# Patient Record
Sex: FEMALE | Race: BLACK OR AFRICAN AMERICAN | NOT HISPANIC OR LATINO
[De-identification: names, ages, dates, MRNs, and addresses within clinical notes are randomized per-mention and may not be internally consistent; named-entity substitution may affect disease eponyms.]

---

## 2019-06-24 ENCOUNTER — RESULT REVIEW (OUTPATIENT)
Age: 74
End: 2019-06-24

## 2019-06-24 ENCOUNTER — APPOINTMENT (OUTPATIENT)
Dept: GERIATRICS | Facility: CLINIC | Age: 74
End: 2019-06-24
Payer: MEDICARE

## 2019-06-24 VITALS
TEMPERATURE: 98.1 F | DIASTOLIC BLOOD PRESSURE: 62 MMHG | SYSTOLIC BLOOD PRESSURE: 132 MMHG | WEIGHT: 187 LBS | HEART RATE: 62 BPM | OXYGEN SATURATION: 98 % | HEIGHT: 61 IN | BODY MASS INDEX: 35.3 KG/M2

## 2019-06-24 DIAGNOSIS — Z82.5 FAMILY HISTORY OF ASTHMA AND OTHER CHRONIC LOWER RESPIRATORY DISEASES: ICD-10-CM

## 2019-06-24 DIAGNOSIS — Z00.00 ENCOUNTER FOR GENERAL ADULT MEDICAL EXAMINATION W/OUT ABNORMAL FINDINGS: ICD-10-CM

## 2019-06-24 DIAGNOSIS — Z60.2 PROBLEMS RELATED TO LIVING ALONE: ICD-10-CM

## 2019-06-24 DIAGNOSIS — Z81.8 FAMILY HISTORY OF OTHER MENTAL AND BEHAVIORAL DISORDERS: ICD-10-CM

## 2019-06-24 DIAGNOSIS — Z84.2 FAMILY HISTORY OF OTHER DISEASES OF THE GENITOURINARY SYSTEM: ICD-10-CM

## 2019-06-24 DIAGNOSIS — Z78.9 OTHER SPECIFIED HEALTH STATUS: ICD-10-CM

## 2019-06-24 PROCEDURE — 99205 OFFICE O/P NEW HI 60 MIN: CPT

## 2019-06-24 SDOH — SOCIAL STABILITY - SOCIAL INSECURITY: PROBLEMS RELATED TO LIVING ALONE: Z60.2

## 2019-06-25 PROBLEM — Z82.5 FAMILY HISTORY OF ASTHMA: Status: ACTIVE | Noted: 2019-06-25

## 2019-06-25 PROBLEM — Z78.9 DOES NOT USE ILLICIT DRUGS: Status: ACTIVE | Noted: 2019-06-25

## 2019-06-25 PROBLEM — Z60.2 LIVING ALONE: Status: ACTIVE | Noted: 2019-06-25

## 2019-06-25 PROBLEM — Z81.8 FAMILY HISTORY OF DEMENTIA: Status: ACTIVE | Noted: 2019-06-25

## 2019-06-25 PROBLEM — Z84.2 FAMILY HISTORY OF BENIGN PROSTATIC HYPERPLASIA: Status: ACTIVE | Noted: 2019-06-25

## 2019-06-25 RX ORDER — DENOSUMAB 60 MG/ML
60 INJECTION SUBCUTANEOUS
Qty: 1 | Refills: 0 | Status: ACTIVE | COMMUNITY
Start: 2019-02-15

## 2019-06-25 RX ORDER — VITAMIN K2 90 MCG
125 MCG CAPSULE ORAL
Refills: 0 | Status: ACTIVE | COMMUNITY
Start: 2019-06-25

## 2019-06-25 RX ORDER — FOLIC ACID 1 MG/1
1 TABLET ORAL
Qty: 90 | Refills: 0 | Status: ACTIVE | COMMUNITY
Start: 2019-01-04

## 2019-06-25 RX ORDER — ELECTROLYTES/DEXTROSE
SOLUTION, ORAL ORAL
Refills: 0 | Status: ACTIVE | COMMUNITY
Start: 2019-06-25

## 2019-06-25 RX ORDER — METHOTREXATE 2.5 MG/1
2.5 TABLET ORAL
Refills: 0 | Status: ACTIVE | COMMUNITY
Start: 2019-01-25

## 2019-06-25 RX ORDER — CERTOLIZUMAB PEGOL 200 MG/ML
2 X 200 INJECTION, SOLUTION SUBCUTANEOUS
Qty: 1 | Refills: 0 | Status: ACTIVE | COMMUNITY
Start: 2019-01-29

## 2019-06-25 RX ORDER — BIOTIN 5 MG
20-60 CAPSULE ORAL
Refills: 0 | Status: ACTIVE | COMMUNITY
Start: 2019-06-25

## 2019-06-25 RX ORDER — GREEN TEA EXTRACT 100 %
90 POWDER (GRAM) MISCELLANEOUS
Refills: 0 | Status: ACTIVE | COMMUNITY
Start: 2019-06-25

## 2019-06-25 NOTE — COUNSELING
[Weight management counseling provided] : Weight management [Healthy eating counseling provided] : healthy eating [Low Fat Diet] : Low fat diet [Activity counseling provided] : activity [Good understanding] : Patient has a good understanding of disease, goals and obesity follow-up plan [Decrease Portions] : Decrease food portions [Low Salt Diet] : Low salt diet [___ min/wk activity recommended] : [unfilled] min/wk activity recommended [Walking] : Walking

## 2019-06-25 NOTE — ASSESSMENT
[FreeTextEntry1] : RA: Stable, symptoms managed with methotrexate and Cimzia.  Continue with folic acid supplementation. \par \par CVI: Advised to keep legs elevated if not standing.  Patient does not like using compression stockings. Advised to keep skin hydrated with an emollient. \par \par Carotid artery disease: Does not want statin or ASA.  Last US improved.  Advised a low fat diet, increase physical activity and weight reduction. \par \par Osteoporosis: Continue with vitamin D supplementation and Prolia.  Will need repeat DEXA Jan 2020.\par \par Obesity: BMI 35.33.  Discussed a low carb, low sugar, low fat diet, increase physical activity. Check HgbA1c. \par \par Cerumen impaction: Attempted removal with curette but patient very sensitive.  Advised to follow up with ENT.\par \par HCM:\par Cancer screenings as above. Will need repeat mammo Jan 2020 (performs biannual exams). Patient declines all vaccines. \par \par Follow up in 3 months.

## 2019-06-25 NOTE — HEALTH RISK ASSESSMENT
[Good] : ~his/her~  mood as  good [No] : In the past 12 months have you used drugs other than those required for medical reasons? No [No falls in past year] : Patient reported no falls in the past year [0] : 2) Feeling down, depressed, or hopeless: Not at all (0) [Patient reported mammogram was normal] : Patient reported mammogram was normal [Patient reported PAP Smear was normal] : Patient reported PAP Smear was normal [Patient reported bone density results were abnormal] : Patient reported bone density results were abnormal [None] : None [Single] : single [Retired] : retired [Alone] : lives alone [Fully functional (bathing, dressing, toileting, transferring, walking, feeding)] : Fully functional (bathing, dressing, toileting, transferring, walking, feeding) [Fully functional (using the telephone, shopping, preparing meals, housekeeping, doing laundry, using] : Fully functional and needs no help or supervision to perform IADLs (using the telephone, shopping, preparing meals, housekeeping, doing laundry, using transportation, managing medications and managing finances) [Smoke Detector] : smoke detector [Carbon Monoxide Detector] : carbon monoxide detector [Seat Belt] :  uses seat belt [] : No [[id number]] : 0 [Change in mental status noted] : No change in mental status noted [Language] : denies difficulty with language [Learning/Retaining New Information] : denies difficulty learning/retaining new information [Handling Complex Tasks] : denies difficulty handling complex tasks [Behavior] : denies difficulty with behavior [Reasoning] : denies difficulty with reasoning [Spatial Ability and Orientation] : denies difficulty with spatial ability and orientation [Reports changes in hearing] : Reports no changes in hearing [Sexually Active] : not sexually active [High Risk Behavior] : no high risk behavior [Reports changes in vision] : Reports no changes in vision [Reports changes in dental health] : Reports no changes in dental health [MammogramDate] : 01/18 [PapSmearDate] : 2006 [PapSmearComments] : No PMB, does not wish for continued sceenings [BoneDensityComments] : Osteoporosis [ColonoscopyDate] : Never [BoneDensityDate] : 01/18 [ColonoscopyComments] : Had Cologuard about 4 years ago, declined repeat testing [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

## 2019-06-25 NOTE — PHYSICAL EXAM
[No Acute Distress] : no acute distress [Well-Appearing] : well-appearing [Normal Sclera/Conjunctiva] : normal sclera/conjunctiva [PERRL] : pupils equal round and reactive to light [EOMI] : extraocular movements intact [Normal Outer Ear/Nose] : the outer ears and nose were normal in appearance [Normal Oropharynx] : the oropharynx was normal [No JVD] : no jugular venous distention [Supple] : supple [No Lymphadenopathy] : no lymphadenopathy [Thyroid Normal, No Nodules] : the thyroid was normal and there were no nodules present [No Respiratory Distress] : no respiratory distress  [Clear to Auscultation] : lungs were clear to auscultation bilaterally [No Accessory Muscle Use] : no accessory muscle use [Normal Rate] : normal rate  [Regular Rhythm] : with a regular rhythm [Normal S1, S2] : normal S1 and S2 [No Murmur] : no murmur heard [No Carotid Bruits] : no carotid bruits [No Abdominal Bruit] : a ~M bruit was not heard ~T in the abdomen [No Varicosities] : no varicosities [Pedal Pulses Present] : the pedal pulses are present [No Extremity Clubbing/Cyanosis] : no extremity clubbing/cyanosis [No Palpable Aorta] : no palpable aorta [Soft] : abdomen soft [Non Tender] : non-tender [Non-distended] : non-distended [No Masses] : no abdominal mass palpated [No HSM] : no HSM [Normal Supraclavicular Nodes] : no supraclavicular lymphadenopathy [Normal Bowel Sounds] : normal bowel sounds [Normal Posterior Cervical Nodes] : no posterior cervical lymphadenopathy [Normal Anterior Cervical Nodes] : no anterior cervical lymphadenopathy [No Spinal Tenderness] : no spinal tenderness [No CVA Tenderness] : no CVA  tenderness [No Joint Swelling] : no joint swelling [Grossly Normal Strength/Tone] : grossly normal strength/tone [No Rash] : no rash [Normal Gait] : normal gait [Coordination Grossly Intact] : coordination grossly intact [No Focal Deficits] : no focal deficits [Alert and Oriented x3] : oriented to person, place, and time [Normal Insight/Judgement] : insight and judgment were intact [Normal Affect] : the affect was normal [de-identified] : Bilateral cerumen impaction [de-identified] : Bilateral knee crepitus [de-identified] : Bilateral non-pitting edema [de-identified] : Bilateral LE skin thickening with hyperpigmentation

## 2019-06-25 NOTE — HISTORY OF PRESENT ILLNESS
Injectable Influenza Immunization Documentation    1.  Is the person to be vaccinated sick today?  No    2. Does the person to be vaccinated have an allergy to eggs or to a component of the vaccine?  No    3. Has the person to be vaccinated today ever had a serious reaction to influenza vaccine in the past?  No    4. Has the person to be vaccinated ever had Guillain-Eighty Four syndrome?  No     Form completed by Martinez Frias CMA       [FreeTextEntry1] : Establish care [de-identified] : 74 year old female with a history of RA, carotid artery disease, obesity, chronic venous insufficiency, osteoporosis who presents today to establish care.  Previous PCP Dr. Trujillo, last visit 3 years ago.\par \par RA: Follows with Dr. Sunshine.  On Cimzia and methotrexate.  States hands are most involved joint.  No recent flares. \par \par Osteoporosis: Was on prednisone in the past.  Receiving Prolia from Dr. Sunshine, last injection April. States had fractures on spine, but believes this was due to injury in adolescence and are not compression fx.\par \par Cardiac history: Follows with Dr. Paige.  Last visit April 2019.  Has history of bilateral ICA stenosis with mild plaque.  Was advised to take statin and ASA, however declines. Last carotid US 10/2018 improved 0-15% stenosis bilateral.  Last TTE 10/2018 normal LVEF, EF 65%, mild TR. Had Holter in April, normal as per patient. Denies CP, SOB/DALH, palpitations, changes in vision, dizziness or syncope.\par \par Chronic venous insufficiency: Stable.  Using HCTZ if increased swelling with weeping.  Does not use compression stockings. \par \par No current complaints today.  \par \par Physical activity: Partakes in a dancing group twice a week however they do not meet during summer months.  Otherwise activity is limited.\par \par Social support: Patient with no children.  She has a younger sister who she is in contact with but no other extended family.  She has few friends but does not use them for support.  She is not involved with any organized Sh[employer] groups.

## 2019-06-25 NOTE — PAST MEDICAL HISTORY
[Menarche Age ____] : age at menarche was [unfilled] [Postmenopausal] : postmenopausal [Total Preg ___] : G[unfilled] [Menopause Age____] : age at menopause was [unfilled]

## 2020-06-25 ENCOUNTER — RESULT REVIEW (OUTPATIENT)
Age: 75
End: 2020-06-25

## 2020-06-25 ENCOUNTER — NON-APPOINTMENT (OUTPATIENT)
Age: 75
End: 2020-06-25

## 2020-06-25 ENCOUNTER — APPOINTMENT (OUTPATIENT)
Dept: GERIATRICS | Facility: CLINIC | Age: 75
End: 2020-06-25
Payer: MEDICARE

## 2020-06-25 VITALS
SYSTOLIC BLOOD PRESSURE: 130 MMHG | WEIGHT: 185 LBS | BODY MASS INDEX: 34.96 KG/M2 | TEMPERATURE: 98.1 F | OXYGEN SATURATION: 97 % | DIASTOLIC BLOOD PRESSURE: 80 MMHG | HEART RATE: 73 BPM

## 2020-06-25 DIAGNOSIS — H61.23 IMPACTED CERUMEN, BILATERAL: ICD-10-CM

## 2020-06-25 PROCEDURE — 93010 ELECTROCARDIOGRAM REPORT: CPT

## 2020-06-25 PROCEDURE — G0439: CPT

## 2020-06-26 PROBLEM — H61.23 BILATERAL IMPACTED CERUMEN: Status: ACTIVE | Noted: 2019-06-25

## 2020-06-26 NOTE — HISTORY OF PRESENT ILLNESS
[FreeTextEntry1] : Medicare Wellness [de-identified] : 75 year old female with a history of RA, carotid artery disease, obesity, chronic venous insufficiency, osteoporosis who presents today for a medicare wellness.  \par \par Patient has no current complaints.  Managing well on her own with COVID restrictions.  Very little physical activity as her dance group is not taking place.  \par \par RA: Follows with Dr. Sunshine.  On Cimzia and methotrexate.  States hands are most involved joint.  No recent flares, joint pain or sweling.\par \par Osteoporosis: Was on prednisone in the past.  Receiving Prolia from Dr. Sunshine. States had fractures on spine, but believes this was due to injury in adolescence and are not compression fx.  Will have repeat DEXA 7/7/20.\par \par Cardiac history: Follows with Dr. Paige.  Last visit April 2019, does not want to continue to see cardiology.  Has history of bilateral ICA stenosis with mild plaque.  Was advised to take statin and ASA, however declines. Last carotid US 10/2018 improved 0-15% stenosis bilateral.  Last TTE 10/2018 normal LVEF, EF 65%, mild TR. Had Holter in April, normal as per patient. Denies CP, SOB/DAHL, palpitations, changes in vision, dizziness or syncope.\par \par Chronic venous insufficiency: Stable.  Has not used HCTZ in about 1 year as per patient.  Does not use compression stockings.

## 2020-06-26 NOTE — ASSESSMENT
[FreeTextEntry1] : RA: Stable, symptoms managed with methotrexate and Cimzia.  Continue with folic acid supplementation. Check folate level, CBC.  Release form completed to obtain records. \par \par CVI: Advised to keep legs elevated if not standing.  Patient does not like using compression stockings. Advised to keep skin hydrated with an emollient. \par \par Carotid artery disease: Does not want statin or ASA.  Last US improved, no bruit on exam, will continue to monitor.  Advised a low fat diet, increase physical activity and weight reduction. Check LP.  \par \par Osteoporosis: Continue with vitamin D supplementation and Prolia.  Scheduled for repeat DEXA 7/7/2020. Check vitamin D level.\par \par Obesity: BMI 34.96.  Discussed a low carb, low sugar, low fat diet, increase physical activity. Check HgbA1c. \par \par Cerumen impaction:   Advised to follow up with ENT.\par \par HCM:\par Cancer screenings as above.  Patient declines all vaccines. \par \par Follow up in 1 year for Medicare Wellness or sooner if needed.

## 2020-06-26 NOTE — PHYSICAL EXAM
[No Acute Distress] : no acute distress [Well-Appearing] : well-appearing [PERRL] : pupils equal round and reactive to light [Normal Sclera/Conjunctiva] : normal sclera/conjunctiva [EOMI] : extraocular movements intact [Normal Oropharynx] : the oropharynx was normal [Normal Outer Ear/Nose] : the outer ears and nose were normal in appearance [No Lymphadenopathy] : no lymphadenopathy [No JVD] : no jugular venous distention [Supple] : supple [No Respiratory Distress] : no respiratory distress  [Clear to Auscultation] : lungs were clear to auscultation bilaterally [No Accessory Muscle Use] : no accessory muscle use [Normal Rate] : normal rate  [Regular Rhythm] : with a regular rhythm [Normal S1, S2] : normal S1 and S2 [No Murmur] : no murmur heard [No Carotid Bruits] : no carotid bruits [No Abdominal Bruit] : a ~M bruit was not heard ~T in the abdomen [No Edema] : there was no peripheral edema [Normal Appearance] : normal in appearance [No Extremity Clubbing/Cyanosis] : no extremity clubbing/cyanosis [No Nipple Discharge] : no nipple discharge [Soft] : abdomen soft [No Axillary Lymphadenopathy] : no axillary lymphadenopathy [Non Tender] : non-tender [Non-distended] : non-distended [No Masses] : no abdominal mass palpated [No HSM] : no HSM [Normal Bowel Sounds] : normal bowel sounds [Normal Anterior Cervical Nodes] : no anterior cervical lymphadenopathy [Normal Posterior Cervical Nodes] : no posterior cervical lymphadenopathy [No Spinal Tenderness] : no spinal tenderness [No Joint Swelling] : no joint swelling [No CVA Tenderness] : no CVA  tenderness [Grossly Normal Strength/Tone] : grossly normal strength/tone [No Rash] : no rash [Coordination Grossly Intact] : coordination grossly intact [No Focal Deficits] : no focal deficits [Normal Gait] : normal gait [Normal Affect] : the affect was normal [Normal Insight/Judgement] : insight and judgment were intact [Alert and Oriented x3] : oriented to person, place, and time [de-identified] : Bilateral cerumen impaction [de-identified] : Bilateral non-pitting edema [de-identified] : Bilateral knee crepitus [de-identified] : Bilateral LE skin thickening with hyperpigmentation

## 2020-06-26 NOTE — REVIEW OF SYSTEMS
[Lower Ext Edema] : lower extremity edema [Joint Pain] : no joint pain [Joint Stiffness] : no joint stiffness [Joint Swelling] : no joint swelling [Negative] : Heme/Lymph

## 2020-06-26 NOTE — HEALTH RISK ASSESSMENT
[Good] : ~his/her~ current health as good [] : No [No] : In the past 12 months have you used drugs other than those required for medical reasons? No [No falls in past year] : Patient reported no falls in the past year [0] : 2) Feeling down, depressed, or hopeless: Not at all (0) [[id number]] : 0 [Patient reported PAP Smear was normal] : Patient reported PAP Smear was normal [Patient reported mammogram was normal] : Patient reported mammogram was normal [Language] : denies difficulty with language [Change in mental status noted] : No change in mental status noted [Behavior] : denies difficulty with behavior [Learning/Retaining New Information] : denies difficulty learning/retaining new information [Handling Complex Tasks] : denies difficulty handling complex tasks [Reasoning] : denies difficulty with reasoning [Spatial Ability and Orientation] : denies difficulty with spatial ability and orientation [Alone] : lives alone [Retired] : retired [Single] : single [Sexually Active] : not sexually active [Feels Safe at Home] : Feels safe at home [High Risk Behavior] : no high risk behavior [Fully functional (bathing, dressing, toileting, transferring, walking, feeding)] : Fully functional (bathing, dressing, toileting, transferring, walking, feeding) [Fully functional (using the telephone, shopping, preparing meals, housekeeping, doing laundry, using] : Fully functional and needs no help or supervision to perform IADLs (using the telephone, shopping, preparing meals, housekeeping, doing laundry, using transportation, managing medications and managing finances) [Reports changes in vision] : Reports changes in vision [Reports changes in hearing] : Reports no changes in hearing [Reports changes in dental health] : Reports no changes in dental health [Smoke Detector] : smoke detector [Carbon Monoxide Detector] : carbon monoxide detector [Seat Belt] :  uses seat belt [PapSmearDate] : 2006 [MammogramComments] : Has repeat scheduled for 7/7/20, biannual screenings. [MammogramDate] : 1/18 [PapSmearComments] : \par No PMB, does not wish for continued sceenings. \par  [BoneDensityDate] : 1/18 [ColonoscopyComments] : Had Cologuard about 4 years ago, declined repeat testing. 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

## 2021-03-10 ENCOUNTER — APPOINTMENT (OUTPATIENT)
Dept: GERIATRICS | Facility: CLINIC | Age: 76
End: 2021-03-10
Payer: MEDICARE

## 2021-03-10 ENCOUNTER — RESULT REVIEW (OUTPATIENT)
Age: 76
End: 2021-03-10

## 2021-03-10 VITALS
HEART RATE: 69 BPM | BODY MASS INDEX: 36.09 KG/M2 | OXYGEN SATURATION: 100 % | TEMPERATURE: 98.5 F | WEIGHT: 293 LBS | SYSTOLIC BLOOD PRESSURE: 138 MMHG | DIASTOLIC BLOOD PRESSURE: 80 MMHG

## 2021-03-10 DIAGNOSIS — M81.0 AGE-RELATED OSTEOPOROSIS W/OUT CURRENT PATHOLOGICAL FRACTURE: ICD-10-CM

## 2021-03-10 DIAGNOSIS — I77.9 DISORDER OF ARTERIES AND ARTERIOLES, UNSPECIFIED: ICD-10-CM

## 2021-03-10 DIAGNOSIS — R03.0 ELEVATED BLOOD-PRESSURE READING, W/OUT DIAGNOSIS OF HYPERTENSION: ICD-10-CM

## 2021-03-10 DIAGNOSIS — M06.9 RHEUMATOID ARTHRITIS, UNSPECIFIED: ICD-10-CM

## 2021-03-10 DIAGNOSIS — I87.2 VENOUS INSUFFICIENCY (CHRONIC) (PERIPHERAL): ICD-10-CM

## 2021-03-10 DIAGNOSIS — E66.9 OBESITY, UNSPECIFIED: ICD-10-CM

## 2021-03-10 PROCEDURE — 99213 OFFICE O/P EST LOW 20 MIN: CPT

## 2021-03-11 PROBLEM — I77.9 CAROTID ARTERY DISEASE: Status: ACTIVE | Noted: 2019-06-24

## 2021-03-11 PROBLEM — M06.9 RHEUMATOID ARTHRITIS: Status: ACTIVE | Noted: 2019-06-24

## 2021-03-11 PROBLEM — M81.0 OSTEOPOROSIS: Status: ACTIVE | Noted: 2019-06-25

## 2021-03-11 PROBLEM — I87.2 CHRONIC VENOUS INSUFFICIENCY: Status: ACTIVE | Noted: 2019-06-25

## 2021-03-11 PROBLEM — E66.9 OBESITY: Status: ACTIVE | Noted: 2019-06-24

## 2021-03-11 PROBLEM — R03.0 BLOOD PRESSURE ELEVATED WITHOUT HISTORY OF HTN: Status: ACTIVE | Noted: 2021-03-11

## 2021-03-11 RX ORDER — HYDROCHLOROTHIAZIDE 25 MG/1
25 TABLET ORAL DAILY
Refills: 0 | Status: COMPLETED | COMMUNITY
Start: 2019-06-25 | End: 2021-03-11

## 2021-03-11 NOTE — HEALTH RISK ASSESSMENT
[] : No [No] : In the past 12 months have you used drugs other than those required for medical reasons? No [No falls in past year] : Patient reported no falls in the past year [0] : 2) Feeling down, depressed, or hopeless: Not at all (0) [[id number]] : 0

## 2021-03-11 NOTE — HISTORY OF PRESENT ILLNESS
[FreeTextEntry1] : Follow up [de-identified] : 76 year old female with a history of RA, carotid artery disease, obesity, chronic venous insufficiency, osteoporosis who presents today for a follow up.  \par \par Patient has no current complaints.  Managing well on her own with COVID restrictions.  Very little physical activity as her dance group is still postponed.    \par \par RA: Follows with Dr. Sunshine.  On Cimzia and methotrexate. Self decreased MTX from 8 to 6 pills weekly as she was told by her pharmacist that her dose was high.  States hands are most involved joint.  No recent flares, joint pain or swelling.\par \par Osteoporosis: Was on prednisone in the past.  Receiving Prolia from Dr. Sunshine. States had fractures on spine, but believes this was due to injury in adolescence and are not compression fx.  Due for repeat DEXA.\par \par Cardiac history: No longer following with cardiology, last visit April 2019.  Has history of bilateral ICA stenosis with mild plaque.  Was advised to take statin and ASA, however declines. Last carotid US 10/2018 improved 0-15% stenosis bilateral.  Last TTE 10/2018 normal LVEF, EF 65%, mild TR. Had Holter in April, normal as per patient. She does acknowledge intermittent palpitations, but not increased from baseline.  Denies CP, SOB/DAHL, changes in vision, dizziness or syncope.  Last week checked her BP at home (wrist cuff), BP was elevated 170 SBP.  Asymptomatic.  Went to [facility] and checked BP that same day, states .\par \par Chronic venous insufficiency: Stable.  No longer using HCTZ.  Does not use compression stockings.

## 2021-03-11 NOTE — ASSESSMENT
[FreeTextEntry1] : Elevated BP: BP today within range.  Using wrist cuff at home which may not be accurate.  Advised to obtain arm cuff if possible.  \par \par RA: Stable, symptoms managed with methotrexate and Cimzia.  Continue with folic acid supplementation. Check CBC.  \par \par CVI: Advised to keep legs elevated if not standing.  Patient does not like using compression stockings. Advised to keep skin hydrated with an emollient. \par \par Carotid artery disease: Does not want statin or ASA.  Last US improved, no bruit on exam, will continue to monitor.  Advised a low fat diet, increase physical activity and weight reduction. Check LP.  \par \par Osteoporosis: Continue with vitamin D supplementation and Prolia.  Check vitamin D level.\par \par Obesity: BMI 36.09.  Discussed a low carb, low sugar, low fat diet, increase physical activity. Check HgbA1c. \par \par \par HCM:\par Mammogram: 1/18. Patient reported mammogram was normal. Was scheduled for repeat 7/7/20, need to follow up if completed. \par PAP Smear: 2006. Patient reported PAP Smear was normal. No PMB, does not wish for continued screenings. \par Bone Density: 1/18. Osteoporosis.  Was scheduled for repeat 7/7/20, need to follow up if completed. \par Colonoscopy: Never. Had Cologuard about 4 years ago, declined repeat testing. \par  \par Patient declines all vaccines. \par \par Follow up in late June for Medicare Wellness or sooner if needed.

## 2021-03-11 NOTE — PHYSICAL EXAM
[No Acute Distress] : no acute distress [Well-Appearing] : well-appearing [Normal Sclera/Conjunctiva] : normal sclera/conjunctiva [PERRL] : pupils equal round and reactive to light [EOMI] : extraocular movements intact [Normal Outer Ear/Nose] : the outer ears and nose were normal in appearance [Normal Oropharynx] : the oropharynx was normal [No JVD] : no jugular venous distention [No Lymphadenopathy] : no lymphadenopathy [Supple] : supple [No Respiratory Distress] : no respiratory distress  [No Accessory Muscle Use] : no accessory muscle use [Clear to Auscultation] : lungs were clear to auscultation bilaterally [Normal Rate] : normal rate  [Regular Rhythm] : with a regular rhythm [Normal S1, S2] : normal S1 and S2 [No Murmur] : no murmur heard [No Carotid Bruits] : no carotid bruits [No Abdominal Bruit] : a ~M bruit was not heard ~T in the abdomen [No Edema] : there was no peripheral edema [No Extremity Clubbing/Cyanosis] : no extremity clubbing/cyanosis [Normal Appearance] : normal in appearance [No Nipple Discharge] : no nipple discharge [No Axillary Lymphadenopathy] : no axillary lymphadenopathy [Soft] : abdomen soft [Non Tender] : non-tender [Non-distended] : non-distended [No Masses] : no abdominal mass palpated [No HSM] : no HSM [Normal Bowel Sounds] : normal bowel sounds [Normal Posterior Cervical Nodes] : no posterior cervical lymphadenopathy [Normal Anterior Cervical Nodes] : no anterior cervical lymphadenopathy [No CVA Tenderness] : no CVA  tenderness [No Spinal Tenderness] : no spinal tenderness [No Joint Swelling] : no joint swelling [Grossly Normal Strength/Tone] : grossly normal strength/tone [No Rash] : no rash [Coordination Grossly Intact] : coordination grossly intact [No Focal Deficits] : no focal deficits [Normal Gait] : normal gait [Normal Affect] : the affect was normal [Alert and Oriented x3] : oriented to person, place, and time [Normal Insight/Judgement] : insight and judgment were intact [de-identified] : Bilateral non-pitting edema [de-identified] : Bilateral knee crepitus [de-identified] : Bilateral LE skin thickening with hyperpigmentation, ?itchyosis

## 2023-10-19 ENCOUNTER — APPOINTMENT (OUTPATIENT)
Dept: VASCULAR SURGERY | Facility: CLINIC | Age: 78
End: 2023-10-19

## 2024-10-29 DIAGNOSIS — M54.50 LOW BACK PAIN, UNSPECIFIED: ICD-10-CM

## 2024-10-30 ENCOUNTER — APPOINTMENT (OUTPATIENT)
Dept: ORTHOPEDIC SURGERY | Facility: CLINIC | Age: 79
End: 2024-10-30
Payer: MEDICARE

## 2024-10-30 VITALS
OXYGEN SATURATION: 99 % | DIASTOLIC BLOOD PRESSURE: 82 MMHG | SYSTOLIC BLOOD PRESSURE: 128 MMHG | HEART RATE: 67 BPM | HEIGHT: 60 IN | BODY MASS INDEX: 31.41 KG/M2 | WEIGHT: 160 LBS

## 2024-10-30 DIAGNOSIS — M54.16 RADICULOPATHY, LUMBAR REGION: ICD-10-CM

## 2024-10-30 DIAGNOSIS — M54.2 CERVICALGIA: ICD-10-CM

## 2024-10-30 DIAGNOSIS — M54.12 RADICULOPATHY, CERVICAL REGION: ICD-10-CM

## 2024-10-30 PROCEDURE — 72110 X-RAY EXAM L-2 SPINE 4/>VWS: CPT

## 2024-10-30 PROCEDURE — 72050 X-RAY EXAM NECK SPINE 4/5VWS: CPT

## 2024-10-30 PROCEDURE — 99204 OFFICE O/P NEW MOD 45 MIN: CPT

## 2024-10-30 PROCEDURE — G2211 COMPLEX E/M VISIT ADD ON: CPT

## 2024-12-11 ENCOUNTER — APPOINTMENT (OUTPATIENT)
Dept: ORTHOPEDIC SURGERY | Facility: CLINIC | Age: 79
End: 2024-12-11
Payer: MEDICARE

## 2024-12-11 VITALS — DIASTOLIC BLOOD PRESSURE: 90 MMHG | HEART RATE: 70 BPM | SYSTOLIC BLOOD PRESSURE: 178 MMHG | OXYGEN SATURATION: 97 %

## 2024-12-11 DIAGNOSIS — M54.12 RADICULOPATHY, CERVICAL REGION: ICD-10-CM

## 2024-12-11 DIAGNOSIS — M54.16 RADICULOPATHY, LUMBAR REGION: ICD-10-CM

## 2024-12-11 PROCEDURE — 99214 OFFICE O/P EST MOD 30 MIN: CPT

## 2024-12-11 PROCEDURE — G2211 COMPLEX E/M VISIT ADD ON: CPT
